# Patient Record
Sex: FEMALE | Race: WHITE | NOT HISPANIC OR LATINO | Employment: FULL TIME | URBAN - METROPOLITAN AREA
[De-identification: names, ages, dates, MRNs, and addresses within clinical notes are randomized per-mention and may not be internally consistent; named-entity substitution may affect disease eponyms.]

---

## 2017-06-21 ENCOUNTER — TELEPHONE (OUTPATIENT)
Dept: GASTROENTEROLOGY | Facility: CLINIC | Age: 57
End: 2017-06-21

## 2017-07-03 ENCOUNTER — TELEPHONE (OUTPATIENT)
Dept: GASTROENTEROLOGY | Facility: CLINIC | Age: 57
End: 2017-07-03

## 2017-07-17 ENCOUNTER — TELEPHONE (OUTPATIENT)
Dept: GASTROENTEROLOGY | Facility: CLINIC | Age: 57
End: 2017-07-17

## 2017-07-17 DIAGNOSIS — Z12.11 SPECIAL SCREENING FOR MALIGNANT NEOPLASMS, COLON: Primary | ICD-10-CM

## 2017-07-17 RX ORDER — ATORVASTATIN CALCIUM 40 MG/1
40 TABLET, FILM COATED ORAL NIGHTLY
COMMUNITY

## 2017-07-17 NOTE — TELEPHONE ENCOUNTER
Reason for Colonoscopy Referral: Screening  Any GI Symptoms: No  Last Colonoscopy:Never   History of Colon Polyps: No  Family History of colon cancer or colon polyps (under 50- history of first degree relative w/colon cancer, what family member-age of onset: No  Iron Deficiency/Anemia: No  Meds reviewed and updated: 07/17/2017  Anti-inflammatory med No   Allergies updated:07/17/2017   6 month surgical history: No  Blood Thinners:No  Lung disease:No   Heart disease:No   Valve replacement:No   Kidney diseaseNo:   SCHEDULED: 07/26/2017   Patient scheduled at [facility] for colonoscopy  On Wednesday July 26, 2017, prep packet was mailed and explained, patient verbalized understanding. Patient aware that surgery will call them with arrival time prior to scope. Called [facility] into [facility] in [place]. Spoke with Griselda.

## 2017-07-26 PROBLEM — Z12.11 SCREENING FOR COLON CANCER: Status: ACTIVE | Noted: 2017-07-26

## 2017-08-09 ENCOUNTER — TELEPHONE (OUTPATIENT)
Dept: GASTROENTEROLOGY | Facility: CLINIC | Age: 57
End: 2017-08-09

## 2017-08-09 NOTE — TELEPHONE ENCOUNTER
----- Message from Sheila Huerta MD sent at 8/8/2017  4:50 PM CDT -----  Pathology report is benign

## 2017-08-09 NOTE — TELEPHONE ENCOUNTER
Informed patient pathology from colonoscopy 7/26/2017 came back benign patient verbalized understanding